# Patient Record
(demographics unavailable — no encounter records)

---

## 2024-11-27 NOTE — PHYSICAL EXAM
[No Acute Distress] : no acute distress [Well Nourished] : well nourished [PERRL] : pupils equal round and reactive to light [EOMI] : extraocular movements intact [Normal Outer Ear/Nose] : the outer ears and nose were normal in appearance [Normal Oropharynx] : the oropharynx was normal [No Lymphadenopathy] : no lymphadenopathy [Supple] : supple [No Respiratory Distress] : no respiratory distress  [No Accessory Muscle Use] : no accessory muscle use [Clear to Auscultation] : lungs were clear to auscultation bilaterally [Normal Rate] : normal rate  [Regular Rhythm] : with a regular rhythm [Normal S1, S2] : normal S1 and S2 [Soft] : abdomen soft [Non Tender] : non-tender [Normal Bowel Sounds] : normal bowel sounds [No Joint Swelling] : no joint swelling [Grossly Normal Strength/Tone] : grossly normal strength/tone [No Focal Deficits] : no focal deficits [Normal Gait] : normal gait

## 2024-11-27 NOTE — HEALTH RISK ASSESSMENT
[0] : 2) Feeling down, depressed, or hopeless: Not at all (0) [PHQ-9 Negative - No further assessment needed] : PHQ-9 Negative - No further assessment needed [Never] : Never

## 2024-12-13 NOTE — ASSESSMENT
[FreeTextEntry1] : 20 y/o male with history of rhabdomyolysis 1 year ago, presents for gross hematuria evaluation. He was admitted to [facility] 1 month ago for Mycoplasma pneumonia and 2-3 days of painless gross hematuria; there was initial concern for glomerulonephritis however workup was ultimately negative. His hematuria resolved during his hospitalization and he has not had recurrence of gross hematuria since.  We discussed that we would recommend cystoscopy to rule out malignancy. He is visiting from [place] and would prefer to see a urologist there.  Plan: -Patient to see urologist in [place] for cystoscopy -Alternatively, cystoscopy here when he returns from [place] in a few months (if not done in [place])

## 2024-12-13 NOTE — HISTORY OF PRESENT ILLNESS
[FreeTextEntry1] : 20 y/o male with history of rhabdomyolysis 1 year ago, presents for gross hematuria evaluation. He was admitted to [facility] 1 month ago for Mycoplasma pneumonia and 2-3 days of painless gross hematuria; there was initial concern for glomerulonephritis however workup was ultimately negative. UAs obtained during hospitalization with negative/trace LE, negative nitrite. His hematuria resolved during his hospitalization and he has not had recurrence of gross hematuria since. Denies dysuria, frequency, urgency. No family history of urologic malignancy.

## 2025-07-11 NOTE — HISTORY OF PRESENT ILLNESS
[FreeTextEntry1] : Initial viist: 12/13/25 19 y/o male with history of rhabdomyolysis 1 year ago, presents for gross hematuria evaluation. He was admitted to [facility] 1 month ago for Mycoplasma pneumonia and 2-3 days of painless gross hematuria; there was initial concern for glomerulonephritis however workup was ultimately negative. UAs obtained during hospitalization with negative/trace LE, negative nitrite. His hematuria resolved during his hospitalization and he has not had recurrence of gross hematuria since. Denies dysuria, frequency, urgency. No family history of urologic malignancy.  RBUS (11/19/24): RIGHT KIDNEY: Length: 11.1 cm Lesions: None Hydronephrosis: None Stones: None Echogenicity: Normal  LEFT KIDNEY: Length: 11.4 cm Lesions: None Hydronephrosis: None Stones: None Echogenicity: Normal  URINARY BLADDER: Ureteral jets: Both visible. Filling defects: None Bladder volume: Pre-void: 566 ml; Post-void: 481 ml.  7/11/25: Patient presents with his parents for planned cystoscopy. Urine dipstick was negative for blood. After discussion, patient and his parents would like to defer cystoscopy given lack of additional gross hematuria episodes in the interim and negative dipstick. Patient denies dysuria, frequency, urgency.   PVR 0

## 2025-07-11 NOTE — PHYSICAL EXAM
[General Appearance - In No Acute Distress] : no acute distress [] : no respiratory distress [Abdomen Soft] : soft [Abdomen Tenderness] : non-tender [Abdomen Mass (___ Cm)] : no abdominal mass palpated [Oriented To Time, Place, And Person] : oriented to person, place, and time

## 2025-07-11 NOTE — ASSESSMENT
[FreeTextEntry1] : 19 y/o male with history of rhabdomyolysis 1 year ago, presents for gross hematuria evaluation. After thorough discussion with patient and parents, cystoscopy was deferred given lack of additional gross hematuria episodes in the interim and negative dipstick. We discussed exceedingly low risk of concerning etiology for gross hematuria given young age, however we cannot definitively rule out mailgnancy without cystoscopy. We elected to send UA and urine cytology today, and obtain formal renal/bladder US given bladder wall thickening on CT 6 months ago.   Plan: -UA with micro and urine cytology today -Renal/bladder US -RTC in 6 months for repeat UA

## 2025-07-11 NOTE — END OF VISIT
[Time Spent: ___ minutes] : I have spent [unfilled] minutes of time on the encounter which excludes teaching and separately reported services. [FreeTextEntry3] :  I discussed the case with the Resident and agree with the findings and plan as documented in the Resident 's note. I have edited the note to reflect any changes in plan/assessment that I feel are appropriate. I agree with his evaluation and plan.